# Patient Record
Sex: FEMALE | Race: WHITE
[De-identification: names, ages, dates, MRNs, and addresses within clinical notes are randomized per-mention and may not be internally consistent; named-entity substitution may affect disease eponyms.]

---

## 2017-02-07 ENCOUNTER — HOSPITAL ENCOUNTER (OUTPATIENT)
Dept: HOSPITAL 62 - WI | Age: 72
End: 2017-02-07
Payer: MEDICARE

## 2017-02-07 DIAGNOSIS — Z12.31: Primary | ICD-10-CM

## 2017-02-07 PROCEDURE — 77063 BREAST TOMOSYNTHESIS BI: CPT

## 2017-02-07 PROCEDURE — G0202 SCR MAMMO BI INCL CAD: HCPCS

## 2017-02-07 PROCEDURE — 77067 SCR MAMMO BI INCL CAD: CPT

## 2017-02-07 NOTE — WOMENS IMAGING REPORT
EXAM DESCRIPTION:  3D SCREENING MAMMO BILAT



COMPLETED DATE/TIME:  2/7/2017 9:40 am



REASON FOR STUDY:  Z12.31, ROUTINE SCREENING MAMMO



COMPARISON:  3/14/2013



TECHNIQUE:  Standard craniocaudal and mediolateral oblique views of each breast recorded using digita
l acquisition and breast tomosynthesis.



LIMITATIONS:  None.



FINDINGS:  Findings present which are benign by mammographic criteria.  No suspicious masses, calcifi
cations or architectural distortion.

Read with the assistance of CAD.

.[facility] - R2 Cenova Version 1.3

.[facility] - R2 Cenova Version 1.3

.[facility] - R2 Cenova Version 2.4

.[facility] [place] [place] - R2 Cenova Version 2.4

.[facility] - R2  Version 9.2

Benign mammographic findings may include one or more of the following:  Smooth masses, popcorn/rim/co
arse calcifications, asymmetries, post-procedure changes, and lesions with long-standing stability.



BREAST DENSITY:  b. There are scattered areas of fibroglandular density.



BIRAD:  2 BENIGN FINDING(S)



RECOMMENDATION:  RECOMMENDATION: ROUTINE SCREENING



COMMENT:  PATIENT NOTIFIED BY LETTER

The American College of Radiology recommends an annual screening mammogram for women aged 40 years or
 over.  Each patient will receive a reminder prior to the anniversary date of her mammogram.

The American College of Radiology (ACR) has developed recommendations for screening MRI of the breast
s in certain patient populations, to be used in conjunction with mammography.  Breast MRI surveillanc
e may be appropriate for women with more than 20% lifetime risk of developing breast cancer  as deter
mined by genetic testing, significant family history of the disease, or history of mantle radiation f
or Hodgkins Disease.  ACR Practice Guidelines 2008.

[employer]



PQRS 6045F: Fluoroscopic imaging is not utilized for breast tomosynthesis.



TECHNICAL DOCUMENTATION:  FINDING NUMBER: (1)

ASSESSMENT: (1)

JOB ID:  [id number]

 2011 [employer]- All Rights Reserved

## 2019-04-22 ENCOUNTER — HOSPITAL ENCOUNTER (OUTPATIENT)
Dept: HOSPITAL 62 - WI | Age: 74
End: 2019-04-22
Payer: MEDICARE

## 2019-04-22 DIAGNOSIS — Z12.31: Primary | ICD-10-CM

## 2019-04-22 PROCEDURE — 77063 BREAST TOMOSYNTHESIS BI: CPT

## 2019-04-22 PROCEDURE — 77067 SCR MAMMO BI INCL CAD: CPT

## 2019-04-22 NOTE — WOMENS IMAGING REPORT
EXAM DESCRIPTION:  3D SCREENING MAMMO BILAT



COMPLETED DATE/TIME:  4/22/2019 9:35 am



REASON FOR STUDY:  Z12.31 ENCOUNTER FOR SCREENING MAMMOGRAM FOR MALIGNANT NEOPLASM OF BREAST Z12.31  
ENCNTR SCREEN MAMMOGRAM FOR MALIGNANT NEOPLASM OF NOLAN



COMPARISON:  2017, 2013



TECHNIQUE:  Standard craniocaudal and mediolateral oblique views of each breast recorded using digita
l acquisition and breast tomosynthesis.



LIMITATIONS:  None.



FINDINGS:  No masses, worrisome calcifications or architectural distortion. No areas of suspicion.

Read with the assistance of CAD.

[facility] Cenova Version [facility].3

.[facility] - R2 Cenova Version 2.1

.[facility] - R2 Cenova Version 2.4

.[facility] [place] - R2 Cenova Version 2.4

.[facility] - R2  Version 9.2



IMPRESSION:  NORMAL MAMMOGRAM.  BIRADS 1.



BREAST DENSITY:  b. There are scattered areas of fibroglandular density.



BIRAD:  1 NEGATIVE



RECOMMENDATION:  ROUTINE SCREENING



COMMENT:  The patient has been notified of the results by letter per SA requirements. Additional no
tification policies are in place for contacting patient with suspicious or incomplete findings.

Quality ID #[id number]: The American College of Radiology recommends an annual screening mammogram for women
 aged 40 years or over. This facility utilizes a reminder system to ensure that all patients receive 
reminder letters, and/or direct phone calls for appointments. This includes reminders for routine scr
eening mammograms, diagnostic mammograms, or other Breast Imaging Interventions when appropriate.  Th
is patient will be placed in the appropriate reminder system.

The American College of Radiology (ACR) has developed recommendations for screening MRI of the breast
s in certain patient populations, to be used in conjunction with mammography.  Breast MRI surveillanc
e may be appropriate for women with more than 20% lifetime risk of developing breast cancer  as deter
mined by genetic testing, significant family history of the disease, or history of mantle radiation f
or Hodgkins Disease.  ACR Practice Guidelines 2008.

[employer]



PQRS 6045F: Fluoroscopic imaging is not utilized for breast tomosynthesis.



TECHNICAL DOCUMENTATION:  FINDING NUMBER: (1)

ASSESSMENT:  (1)

JOB ID:  [id number]

 2011 [employer]- All Rights Reserved



Reading location - IP/workstation name: [email or web address]

## 2020-07-25 ENCOUNTER — HOSPITAL ENCOUNTER (OUTPATIENT)
Dept: HOSPITAL 62 - OD | Age: 75
End: 2020-07-25
Attending: INTERNAL MEDICINE
Payer: MEDICARE

## 2020-07-25 DIAGNOSIS — Z79.899: ICD-10-CM

## 2020-07-25 DIAGNOSIS — R73.01: Primary | ICD-10-CM

## 2020-07-25 DIAGNOSIS — E78.00: ICD-10-CM

## 2020-07-25 DIAGNOSIS — I49.3: ICD-10-CM

## 2020-07-25 DIAGNOSIS — I10: ICD-10-CM

## 2020-07-25 LAB
ALBUMIN SERPL-MCNC: 4.1 G/DL (ref 3.5–5)
ALP SERPL-CCNC: 72 U/L (ref 38–126)
ANION GAP SERPL CALC-SCNC: 4 MMOL/L (ref 5–19)
AST SERPL-CCNC: 21 U/L (ref 14–36)
BILIRUB DIRECT SERPL-MCNC: 0 MG/DL (ref 0–0.4)
BILIRUB SERPL-MCNC: 0.5 MG/DL (ref 0.2–1.3)
BUN SERPL-MCNC: 12 MG/DL (ref 7–20)
CALCIUM: 9.3 MG/DL (ref 8.4–10.2)
CHLORIDE SERPL-SCNC: 102 MMOL/L (ref 98–107)
CHOLEST SERPL-MCNC: 188.08 MG/DL (ref 0–200)
CO2 SERPL-SCNC: 35 MMOL/L (ref 22–30)
ERYTHROCYTE [DISTWIDTH] IN BLOOD BY AUTOMATED COUNT: 13.1 % (ref 11.5–14)
GLUCOSE SERPL-MCNC: 112 MG/DL (ref 75–110)
HCT VFR BLD CALC: 43.7 % (ref 36–47)
HGB BLD-MCNC: 14.8 G/DL (ref 12–15.5)
LDLC SERPL DIRECT ASSAY-MCNC: 114 MG/DL (ref ?–100)
MCH RBC QN AUTO: 31.3 PG (ref 27–33.4)
MCHC RBC AUTO-ENTMCNC: 33.9 G/DL (ref 32–36)
MCV RBC AUTO: 92 FL (ref 80–97)
PLATELET # BLD: 262 10^3/UL (ref 150–450)
POTASSIUM SERPL-SCNC: 5.1 MMOL/L (ref 3.6–5)
PROT SERPL-MCNC: 7.3 G/DL (ref 6.3–8.2)
RBC # BLD AUTO: 4.73 10^6/UL (ref 3.72–5.28)
TRIGL SERPL-MCNC: 167 MG/DL (ref ?–150)
VLDLC SERPL CALC-MCNC: 33.4 MG/DL (ref 10–31)
WBC # BLD AUTO: 8.2 10^3/UL (ref 4–10.5)

## 2020-07-25 PROCEDURE — 80061 LIPID PANEL: CPT

## 2020-07-25 PROCEDURE — 83735 ASSAY OF MAGNESIUM: CPT

## 2020-07-25 PROCEDURE — 80076 HEPATIC FUNCTION PANEL: CPT

## 2020-07-25 PROCEDURE — 80048 BASIC METABOLIC PNL TOTAL CA: CPT

## 2020-07-25 PROCEDURE — 36415 COLL VENOUS BLD VENIPUNCTURE: CPT

## 2020-07-25 PROCEDURE — 83036 HEMOGLOBIN GLYCOSYLATED A1C: CPT

## 2020-07-25 PROCEDURE — 83525 ASSAY OF INSULIN: CPT

## 2020-07-25 PROCEDURE — 84443 ASSAY THYROID STIM HORMONE: CPT

## 2020-07-25 PROCEDURE — 85027 COMPLETE CBC AUTOMATED: CPT
